# Patient Record
Sex: MALE | Race: WHITE | NOT HISPANIC OR LATINO | Employment: OTHER | RURAL
[De-identification: names, ages, dates, MRNs, and addresses within clinical notes are randomized per-mention and may not be internally consistent; named-entity substitution may affect disease eponyms.]

---

## 2022-06-27 ENCOUNTER — OFFICE VISIT (OUTPATIENT)
Dept: FAMILY MEDICINE CLINIC | Facility: CLINIC | Age: 67
End: 2022-06-27

## 2022-06-27 VITALS
WEIGHT: 159 LBS | DIASTOLIC BLOOD PRESSURE: 70 MMHG | HEIGHT: 65 IN | SYSTOLIC BLOOD PRESSURE: 136 MMHG | BODY MASS INDEX: 26.49 KG/M2 | HEART RATE: 57 BPM | OXYGEN SATURATION: 97 %

## 2022-06-27 DIAGNOSIS — R20.2 PARESTHESIAS: ICD-10-CM

## 2022-06-27 DIAGNOSIS — R53.83 FATIGUE, UNSPECIFIED TYPE: ICD-10-CM

## 2022-06-27 DIAGNOSIS — K64.0 GRADE I HEMORRHOIDS: ICD-10-CM

## 2022-06-27 DIAGNOSIS — R03.0 ELEVATED BLOOD PRESSURE READING IN OFFICE WITHOUT DIAGNOSIS OF HYPERTENSION: ICD-10-CM

## 2022-06-27 DIAGNOSIS — Z00.01 ENCOUNTER FOR GENERAL ADULT MEDICAL EXAMINATION WITH ABNORMAL FINDINGS: Primary | ICD-10-CM

## 2022-06-27 DIAGNOSIS — Z12.11 COLON CANCER SCREENING: ICD-10-CM

## 2022-06-27 DIAGNOSIS — Z12.5 PROSTATE CANCER SCREENING: ICD-10-CM

## 2022-06-27 DIAGNOSIS — N40.1 BENIGN PROSTATIC HYPERPLASIA (BPH) WITH URINARY URGENCY: ICD-10-CM

## 2022-06-27 DIAGNOSIS — D49.2 SKIN NEOPLASM: ICD-10-CM

## 2022-06-27 DIAGNOSIS — E78.5 DYSLIPIDEMIA: ICD-10-CM

## 2022-06-27 DIAGNOSIS — J30.1 SEASONAL ALLERGIC RHINITIS DUE TO POLLEN: ICD-10-CM

## 2022-06-27 DIAGNOSIS — R39.15 BENIGN PROSTATIC HYPERPLASIA (BPH) WITH URINARY URGENCY: ICD-10-CM

## 2022-06-27 DIAGNOSIS — N39.41 URGE INCONTINENCE OF URINE: ICD-10-CM

## 2022-06-27 PROCEDURE — 36415 COLL VENOUS BLD VENIPUNCTURE: CPT | Performed by: FAMILY MEDICINE

## 2022-06-27 PROCEDURE — 3008F BODY MASS INDEX DOCD: CPT | Performed by: FAMILY MEDICINE

## 2022-06-27 PROCEDURE — 99387 INIT PM E/M NEW PAT 65+ YRS: CPT | Performed by: FAMILY MEDICINE

## 2022-06-27 PROCEDURE — 2014F MENTAL STATUS ASSESS: CPT | Performed by: FAMILY MEDICINE

## 2022-06-27 NOTE — PROGRESS NOTES
Chief Complaint  Establish Care (Has not seen a PCP for many years needs a new PCP. ) and Annual Exam    Subjective      Artur Pinto presents to [facility]  History of Present Illness  Patient says he is here for annual physical exam.  He has not had a primary care physician for many years.  His wife  2 months ago of cancer.  He is raising 3 step grandchildren that he is adopted as his own children aged 9, 10, and 11.  He says he is coping okay with his wife's death.  He has had some fatigue for the last 2 years, just does not have as much stamina as he used to, but denies any chest pain or shortness of breath, and no history of any near syncope or presyncope.  He does have some seasonal allergies with runny nose and sneezing.  Patient reports a history of urinary frequency and urgency and nocturia every 2 hours.  He says he underwent a procedure with a urologist a couple of years ago that involved a cystoscopy and possibly something else, but he cannot recall the name of the procedure and does not know exactly what was done.  He has borderline literacy, stating that he was passed through until the ninth grade but never really developed good reading skills.  He says that what ever the urologist found with a cystoscopy, he was told that he could remove it if the patient wanted him to but he did not have to and it would not cause him any problems in the long run.  I asked him if it was something to treat enlarged prostate or possibly urethral stricture, the patient simply does not know.    His only other current complaint is he has some symptoms of a hemorrhoid over the last couple of weeks with a tender itchy nodule at his anal opening, but denies any rectal bleeding or melena.  Denies any other GI symptoms, see review of systems.  He states his stool has always been a bit loose but he only has 1 bowel movement a day, although sometimes he can have a little bit of urge fecal  "incontinence along with his urge urinary incontinence.  He does have onychomycosis of his right thumbnail and right big toe, but this does not bother him.  His only other complaint is that he is noted a lesion on his left outer ear that will not heal, has been present for about 2 years and is crusty, sometimes bleeding.  He has been in the sun a lot throughout his life.    The patient said he has not seen a primary care doctor in many years, but he has been getting a flu shot and pneumonia vaccines regularly over the last 15 years and does not need any boosters on anything right now.  He does not think he has had the shingles vaccine so I would did advise him to get that.  Patient's last colonoscopy was 25 years ago so we did discuss colon cancer screening, and he prefers to do a Cologuard test at this time, declines to do colonoscopy.  Pros and cons of each test were discussed    Objective   Vital Signs:   Vitals:    06/27/22 1118 06/27/22 1210   BP: 152/70 136/70   BP Location: Left arm Left arm   Patient Position: Sitting Sitting   Cuff Size: Large Adult    Pulse: 57    SpO2: 97%    Weight: 72.1 kg (159 lb)    Height: 165.1 cm (65\")       /70 (BP Location: Left arm, Patient Position: Sitting)   Pulse 57   Ht 165.1 cm (65\")   Wt 72.1 kg (159 lb)   SpO2 97%   BMI 26.46 kg/m²     Body mass index is 26.46 kg/m².    Review of Systems   Constitutional: Positive for fatigue. Negative for chills, diaphoresis, fever, unexpected weight gain and unexpected weight loss.   HENT: Positive for hearing loss, postnasal drip, rhinorrhea and sneezing. Negative for congestion, ear discharge, ear pain, facial swelling, mouth sores, nosebleeds, sinus pressure, sore throat, swollen glands, trouble swallowing and voice change.    Eyes: Negative for blurred vision, double vision, photophobia, pain, redness and visual disturbance.   Respiratory: Negative for apnea, cough, chest tightness, shortness of breath and wheezing.  "   Cardiovascular: Negative for chest pain, palpitations and leg swelling.        PND, orthopnea   Gastrointestinal: Positive for rectal pain. Negative for abdominal distention, abdominal pain, anal bleeding, blood in stool, constipation, diarrhea, nausea, vomiting, GERD and indigestion.        Dysphagia, odynophagia   Endocrine: Negative for polydipsia, polyphagia and polyuria.   Genitourinary: Positive for frequency, nocturia, urgency and urinary incontinence. Negative for decreased urine volume, difficulty urinating, dysuria, hematuria, penile swelling, scrotal swelling and testicular pain.   Musculoskeletal: Negative for arthralgias (unusual/atypica), back pain, gait problem, joint swelling, myalgias and neck pain.   Skin: Positive for skin lesions (worrisome/suspicious). Negative for rash and bruise.   Allergic/Immunologic: Positive for environmental allergies. Negative for food allergies.   Neurological: Negative for dizziness, tremors, seizures, syncope, speech difficulty, weakness, light-headedness, numbness, headache, memory problem and confusion.   Hematological: Negative for adenopathy. Does not bruise/bleed easily.   Psychiatric/Behavioral: Positive for stress. Negative for sleep disturbance, suicidal ideas and depressed mood. The patient is not nervous/anxious.        Past History:  Medical History: has a past medical history of BPH associated with nocturia.   Surgical History: has a past surgical history that includes Inguinal hernia repair (Left, 2010); Colonoscopy (1998); and Cystoscopy.   Family History: family history includes Diabetes type II in his sister; Heart failure in his mother; Peripheral vascular disease in his mother; Stomach cancer in his father.   Social History: reports that he has never smoked. He has never used smokeless tobacco. He reports current alcohol use of about 3.0 standard drinks of alcohol per week. He reports that he does not use drugs.    No current outpatient medications  on file.    Allergies: Patient has no known allergies.    Physical Exam  Constitutional:       General: He is not in acute distress.     Appearance: Normal appearance. He is not toxic-appearing.   HENT:      Head: Normocephalic and atraumatic.      Right Ear: Tympanic membrane, ear canal and external ear normal.      Left Ear: Tympanic membrane and ear canal normal.      Ears:      Comments: Slightly ulcerated eschar on left auricle about 6 to 8 mm in diameter inside the outer helix     Nose: Rhinorrhea present.      Mouth/Throat:      Mouth: Mucous membranes are moist.      Pharynx: Oropharynx is clear. No posterior oropharyngeal erythema.   Eyes:      General: No scleral icterus.     Extraocular Movements: Extraocular movements intact.      Conjunctiva/sclera: Conjunctivae normal.      Pupils: Pupils are equal, round, and reactive to light.   Neck:      Vascular: No carotid bruit.   Cardiovascular:      Rate and Rhythm: Normal rate and regular rhythm.      Pulses: Normal pulses.      Heart sounds: Normal heart sounds. No murmur heard.    No gallop.   Pulmonary:      Effort: Pulmonary effort is normal.      Breath sounds: Normal breath sounds. No wheezing, rhonchi or rales.   Chest:      Chest wall: No tenderness.   Abdominal:      General: Bowel sounds are normal. There is no distension.      Palpations: Abdomen is soft. There is no mass.      Tenderness: There is no abdominal tenderness. There is no right CVA tenderness, left CVA tenderness, guarding or rebound.      Comments: Exam of external anal opening shows a small hemorrhoid, not thrombosed, and 9:00   Musculoskeletal:         General: No swelling, tenderness or deformity. Normal range of motion.      Cervical back: Normal range of motion. No rigidity.      Right lower leg: No edema.      Left lower leg: No edema.   Lymphadenopathy:      Cervical: No cervical adenopathy.   Skin:     General: Skin is warm and dry.      Capillary Refill: Capillary refill  takes less than 2 seconds.      Coloration: Skin is not jaundiced or pale.      Findings: Lesion (See ear exam) present. No bruising, erythema or rash.   Neurological:      General: No focal deficit present.      Mental Status: He is alert and oriented to person, place, and time.      Cranial Nerves: No cranial nerve deficit.      Sensory: No sensory deficit.      Motor: No weakness.      Coordination: Coordination normal.      Gait: Gait normal.      Deep Tendon Reflexes: Reflexes normal.   Psychiatric:         Mood and Affect: Mood normal.         Behavior: Behavior normal.         Thought Content: Thought content normal.         Judgment: Judgment normal.          Result Review :               Assessment and Plan   Diagnoses and all orders for this visit:    1. Encounter for general adult medical examination with abnormal findings (Primary)  Preventive healthcare issues were discussed at length, and the patient does agreed to do a Cologuard test, we will check PSA with labs today.  He states he is up-to-date on pneumococcal vaccines and flu vaccines but will get the shingles vaccine at the pharmacy.  Healthy lifestyle including diet and exercise were discussed.  Need for annual physical exam was discussed  2. Dyslipidemia  -     Lipid Panel; Future  -     Lipid Panel    3. Fatigue, unspecified type  -     CBC Auto Differential; Future  -     Comprehensive Metabolic Panel; Future  -     TSH; Future  -     CBC Auto Differential  -     Comprehensive Metabolic Panel  -     TSH  Differential diagnosis discussed.  Patient says he just attributed the symptoms to aging, but we will check labs.  Red flag and warning signs and symptoms were discussed, so if he develops any chest pain or shortness of breath or presyncope he will let us know.  4. Elevated blood pressure reading in office without diagnosis of hypertension  Recheck of blood pressure was normal, patient will monitor  5. Prostate cancer screening  -     PSA  "Screen; Future  -     PSA Screen    6. Skin neoplasm  -     Ambulatory Referral to ENT (Otolaryngology)  Skin lesion on left outer ear looks suspicious for malignancy, so we will refer to ENT due to location.  Option of seeing dermatology was discussed, but since this will almost certainly need surgical removal of a large area of the left outer ear ENT was felt more appropriate skin protection was discussed  7. Colon cancer screening  -     Cologuard - Stool, Per Rectum; Future    8. Grade I hemorrhoids  Patient will take fiber supplement and apply over-the-counter hydrocortisone cream 2-3 times a day, may do sitz bath's if desired, and if symptoms worsen he will let me know  9. Benign prostatic hyperplasia (BPH) with urinary urgency  We will attempt to obtain records so we can see what the urologist found and what he did.  The patient does not want to take any medications currently.  I would assume that the frequency and urgency are due to BPH, but the reported gives about the urologist finding something \"that he was born with\" which \"should not harm him in the future\"  Wonder if there is a stricture or some other anatomic abnormality there.  10. Paresthesias  -     Vitamin B12; Future  -     Folate; Future  -     Vitamin B12  -     Folate  Patient did report an occasional tingle in his he underfoot but nothing persistent or worrisome, but since also has fatigue we will check B12 and folic acid.  I advised him to come back in 1 year or sooner if needed.  We did discuss normal grieving, and he seems to be coping with the death of his wife pretty well.  However, he is now a single parent and I told him that things can sometimes get overwhelming so if he develops any depression symptoms he can return to discuss treatment options.               Follow Up   Return in about 1 year (around 6/27/2023) for Annual physical.  Patient was given instructions and counseling regarding his condition or for health maintenance " advice. Please see specific information pulled into the AVS if appropriate.     Kameron Pro MD

## 2022-06-28 LAB
ALBUMIN SERPL-MCNC: 4.3 G/DL (ref 3.8–4.8)
ALBUMIN/GLOB SERPL: 1.5 {RATIO} (ref 1.2–2.2)
ALP SERPL-CCNC: 50 IU/L (ref 44–121)
ALT SERPL-CCNC: 15 IU/L (ref 0–44)
AST SERPL-CCNC: 19 IU/L (ref 0–40)
BASOPHILS # BLD AUTO: 0.1 X10E3/UL (ref 0–0.2)
BASOPHILS NFR BLD AUTO: 1 %
BILIRUB SERPL-MCNC: 0.6 MG/DL (ref 0–1.2)
BUN SERPL-MCNC: 13 MG/DL (ref 8–27)
BUN/CREAT SERPL: 15 (ref 10–24)
CALCIUM SERPL-MCNC: 9.4 MG/DL (ref 8.6–10.2)
CHLORIDE SERPL-SCNC: 106 MMOL/L (ref 96–106)
CHOLEST SERPL-MCNC: 194 MG/DL (ref 100–199)
CO2 SERPL-SCNC: 21 MMOL/L (ref 20–29)
CREAT SERPL-MCNC: 0.86 MG/DL (ref 0.76–1.27)
EGFRCR SERPLBLD CKD-EPI 2021: 95 ML/MIN/1.73
EOSINOPHIL # BLD AUTO: 0.6 X10E3/UL (ref 0–0.4)
EOSINOPHIL NFR BLD AUTO: 7 %
ERYTHROCYTE [DISTWIDTH] IN BLOOD BY AUTOMATED COUNT: 13.6 % (ref 11.6–15.4)
FOLATE SERPL-MCNC: 7.4 NG/ML
GLOBULIN SER CALC-MCNC: 2.9 G/DL (ref 1.5–4.5)
GLUCOSE SERPL-MCNC: 100 MG/DL (ref 65–99)
HCT VFR BLD AUTO: 50 % (ref 37.5–51)
HDLC SERPL-MCNC: 41 MG/DL
HGB BLD-MCNC: 16.6 G/DL (ref 13–17.7)
IMM GRANULOCYTES # BLD AUTO: 0 X10E3/UL (ref 0–0.1)
IMM GRANULOCYTES NFR BLD AUTO: 0 %
LDLC SERPL CALC-MCNC: 135 MG/DL (ref 0–99)
LYMPHOCYTES # BLD AUTO: 1.8 X10E3/UL (ref 0.7–3.1)
LYMPHOCYTES NFR BLD AUTO: 20 %
MCH RBC QN AUTO: 29.6 PG (ref 26.6–33)
MCHC RBC AUTO-ENTMCNC: 33.2 G/DL (ref 31.5–35.7)
MCV RBC AUTO: 89 FL (ref 79–97)
MONOCYTES # BLD AUTO: 0.5 X10E3/UL (ref 0.1–0.9)
MONOCYTES NFR BLD AUTO: 6 %
NEUTROPHILS # BLD AUTO: 6 X10E3/UL (ref 1.4–7)
NEUTROPHILS NFR BLD AUTO: 66 %
PLATELET # BLD AUTO: 362 X10E3/UL (ref 150–450)
POTASSIUM SERPL-SCNC: 4.8 MMOL/L (ref 3.5–5.2)
PROT SERPL-MCNC: 7.2 G/DL (ref 6–8.5)
PSA SERPL-MCNC: 0.9 NG/ML (ref 0–4)
RBC # BLD AUTO: 5.61 X10E6/UL (ref 4.14–5.8)
SODIUM SERPL-SCNC: 140 MMOL/L (ref 134–144)
TRIGL SERPL-MCNC: 96 MG/DL (ref 0–149)
TSH SERPL DL<=0.005 MIU/L-ACNC: 1.69 UIU/ML (ref 0.45–4.5)
VIT B12 SERPL-MCNC: 391 PG/ML (ref 232–1245)
VLDLC SERPL CALC-MCNC: 18 MG/DL (ref 5–40)
WBC # BLD AUTO: 9 X10E3/UL (ref 3.4–10.8)

## 2022-12-01 ENCOUNTER — OFFICE VISIT (OUTPATIENT)
Dept: FAMILY MEDICINE CLINIC | Facility: CLINIC | Age: 67
End: 2022-12-01

## 2022-12-01 VITALS
SYSTOLIC BLOOD PRESSURE: 120 MMHG | DIASTOLIC BLOOD PRESSURE: 76 MMHG | HEART RATE: 83 BPM | HEIGHT: 65 IN | BODY MASS INDEX: 28.42 KG/M2 | OXYGEN SATURATION: 98 % | WEIGHT: 170.56 LBS

## 2022-12-01 DIAGNOSIS — H93.90 EAR LESION: ICD-10-CM

## 2022-12-01 DIAGNOSIS — N40.1 BENIGN PROSTATIC HYPERPLASIA (BPH) WITH URINARY URGENCY: ICD-10-CM

## 2022-12-01 DIAGNOSIS — Z71.89 ADVANCED DIRECTIVES, COUNSELING/DISCUSSION: ICD-10-CM

## 2022-12-01 DIAGNOSIS — E78.2 MIXED HYPERLIPIDEMIA: ICD-10-CM

## 2022-12-01 DIAGNOSIS — N40.0 BENIGN PROSTATIC HYPERPLASIA WITHOUT LOWER URINARY TRACT SYMPTOMS: ICD-10-CM

## 2022-12-01 DIAGNOSIS — Z12.5 SCREENING FOR PROSTATE CANCER: ICD-10-CM

## 2022-12-01 DIAGNOSIS — Z12.11 SCREENING FOR COLON CANCER: ICD-10-CM

## 2022-12-01 DIAGNOSIS — R39.15 BENIGN PROSTATIC HYPERPLASIA (BPH) WITH URINARY URGENCY: ICD-10-CM

## 2022-12-01 DIAGNOSIS — Z00.00 INITIAL MEDICARE ANNUAL WELLNESS VISIT: Primary | ICD-10-CM

## 2022-12-01 DIAGNOSIS — K64.9 HEMORRHOIDS, UNSPECIFIED HEMORRHOID TYPE: ICD-10-CM

## 2022-12-01 PROCEDURE — 1170F FXNL STATUS ASSESSED: CPT | Performed by: NURSE PRACTITIONER

## 2022-12-01 PROCEDURE — 1159F MED LIST DOCD IN RCRD: CPT | Performed by: NURSE PRACTITIONER

## 2022-12-01 PROCEDURE — G0438 PPPS, INITIAL VISIT: HCPCS | Performed by: NURSE PRACTITIONER

## 2022-12-01 PROCEDURE — 99213 OFFICE O/P EST LOW 20 MIN: CPT | Performed by: NURSE PRACTITIONER

## 2022-12-01 RX ORDER — DOXYCYCLINE HYCLATE 100 MG/1
100 CAPSULE ORAL 2 TIMES DAILY
Qty: 20 CAPSULE | Refills: 0 | Status: SHIPPED | OUTPATIENT
Start: 2022-12-01

## 2022-12-01 NOTE — ASSESSMENT & PLAN NOTE
Patient had blood work completed in June.  Patient denies hepatitis C screening at this time.  Proper diet and exercise plan discussed and encouraged.  Annual dental and eye exams encouraged.  Cologuard up-to-date. Understands limitations of cologuard vs colonsocopy.  He does not smoke and states he has never smoked.  He denies immunizations at this time and encouraged him to discuss pneumonia shingles tetanus COVID vaccines with his pharmacist.  Flu vaccine up-to-date.  Does not need help with ADLs.  Continue wearing hearing aids.  No cognitive impairment.  Continue follow-up with PCP Dr. Pro.  Return to clinic or ED with any issues or concerns.

## 2022-12-01 NOTE — PROGRESS NOTES
"Chief Complaint  Left ear sore (Right thumb discoloration)    Subjective          Artur Pinto presents to [facility]  History of Present Illness     Patient states for a year he has had a sore on the upper fold of his left ear.  States it is tender.  Slightly reddened.  No fever no chills no body aches.  He does keep it clean with soap and water.  States it never fully heals up and is usually always there.  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    Objective   Vital Signs:   /76   Pulse 83   Ht 165.1 cm (65\")   Wt 77.4 kg (170 lb 9 oz)   SpO2 98%   BMI 28.38 kg/m²     Body mass index is 28.38 kg/m².    Review of Systems   Constitutional: Negative for chills, fatigue and fever.   HENT: Negative for ear discharge and ear pain.    Eyes: Negative for visual disturbance.   Respiratory: Negative for cough.    Cardiovascular: Negative for chest pain.   Genitourinary: Negative for dysuria, frequency and hematuria.   Musculoskeletal: Negative for arthralgias and back pain.   Skin: Positive for skin lesions.   Neurological: Negative for dizziness, headache and confusion.       Past History:  Medical History: has a past medical history of BPH associated with nocturia.   Surgical History: has a past surgical history that includes Inguinal hernia repair (Left, 2010); Colonoscopy (1998); and Cystoscopy.   Family History: family history includes Diabetes type II in his sister; Heart failure in his mother; Peripheral vascular disease in his mother; Stomach cancer in his father.   Social History: reports that he has never smoked. He has never used smokeless tobacco. He reports current alcohol use of about 3.0 standard drinks per week. He reports that he does not use drugs.    PHQ-2 Depression Screening  Little interest or pleasure in doing things? 0-->not at all   Feeling down, depressed, or hopeless? 0-->not at all   PHQ-2 Total Score 0        PHQ-9 " Depression Screening  Little interest or pleasure in doing things? 0-->not at all   Feeling down, depressed, or hopeless? 0-->not at all   Trouble falling or staying asleep, or sleeping too much?     Feeling tired or having little energy?     Poor appetite or overeating?     Feeling bad about yourself - or that you are a failure or have let yourself or your family down?     Trouble concentrating on things, such as reading the newspaper or watching television?     Moving or speaking so slowly that other people could have noticed? Or the opposite - being so fidgety or restless that you have been moving around a lot more than usual?     Thoughts that you would be better off dead, or of hurting yourself in some way?     PHQ-9 Total Score 0   If you checked off any problems, how difficult have these problems made it for you to do your work, take care of things at home, or get along with other people?       PHQ-9 Total Score: 0      Patient screened positive for depression based on a PHQ-9 score of 0 on 12/1/2022. Follow-up recommendations include:       Current Outpatient Medications:   •  doxycycline (VIBRAMYCIN) 100 MG capsule, Take 1 capsule by mouth 2 (Two) Times a Day., Disp: 20 capsule, Rfl: 0   (Not in a hospital admission)     Allergies: Patient has no known allergies.    Physical Exam  Constitutional:       Appearance: Normal appearance.   Eyes:      Conjunctiva/sclera: Conjunctivae normal.      Pupils: Pupils are equal, round, and reactive to light.   Cardiovascular:      Rate and Rhythm: Normal rate and regular rhythm.      Heart sounds: Normal heart sounds.   Pulmonary:      Effort: Pulmonary effort is normal.      Breath sounds: Normal breath sounds.   Skin:     Comments: Left ear upper fold area there is a small lesion present with top layer skin off that has clear drainage. Tender to palpation.    Neurological:      General: No focal deficit present.      Mental Status: He is alert and oriented to person,  place, and time. Mental status is at baseline.   Psychiatric:         Mood and Affect: Mood normal.         Behavior: Behavior normal.         Thought Content: Thought content normal.         Judgment: Judgment normal.          Result Review :                   Assessment and Plan    Diagnoses and all orders for this visit:    1. Ear lesion (Primary)  Assessment & Plan:  Ear culture obtained and sent.  Informed patient to call in 2 to 3 days for results.  Antibiotic as prescribed as likely infection present due to tenderness and drainage.  Keep area clean with soap and water.  Risk of meds discussed understood.  Since it has been there for years we will also go ahead and refer to dermatology for further evaluation.  Education provided.  Return in 2 days if no improvement, sooner if worsens.  Return to clinic or ED with any issues or concerns.    Orders:  -     Ambulatory Referral to Dermatology  -     doxycycline (VIBRAMYCIN) 100 MG capsule; Take 1 capsule by mouth 2 (Two) Times a Day.  Dispense: 20 capsule; Refill: 0  -     Anaerobic & Aerobic Culture ([facility] Only) - Swab, Ear, Left            BMI is >= 25 and <30. (Overweight) The following options were offered after discussion;: exercise counseling/recommendations and nutrition counseling/recommendations       Follow Up   Return if symptoms worsen or fail to improve.  Patient was given instructions and counseling regarding his condition or for health maintenance advice. Please see specific information pulled into the AVS if appropriate.     SUSI Erwin

## 2022-12-01 NOTE — PROGRESS NOTES
The ABCs of the Annual Wellness Visit  Initial Medicare Wellness Visit    Chief Complaint   Patient presents with   • Left ear sore     Right thumb discoloration     Subjective   History of Present Illness:  Artur Pinto is a 67 y.o. male who presents for an Initial Medicare Wellness Visit.    The following portions of the patient's history were reviewed and   updated as appropriate: allergies, current medications, past family history, past medical history, past social history, past surgical history and problem list.     Compared to one year ago, the patient feels his physical   health is the same.    Compared to one year ago, the patient feels his mental   health is the same.    Recent Hospitalizations:  He was not admitted to the hospital during the last year.       Current Medical Providers:  Patient Care Team:  Kameron Pro MD as PCP - General (Family Medicine)    No outpatient medications prior to visit.     No facility-administered medications prior to visit.       No opioid medication identified on active medication list. I have reviewed chart for other potential  high risk medication/s and harmful drug interactions in the elderly.          Aspirin is not on active medication list.  Aspirin use is not indicated based on review of current medical condition/s. Risk of harm outweighs potential benefits.  .    Patient Active Problem List   Diagnosis   • Benign prostatic hyperplasia (BPH) with urinary urgency   • Urge incontinence of urine   • Ear lesion   • Initial Medicare annual wellness visit   • Advanced directives, counseling/discussion   • Screening for colon cancer   • Screening for prostate cancer   • Mixed hyperlipidemia   • Hemorrhoids   • BPH (benign prostatic hyperplasia)     Advance Care Planning  Advance Directive is not on file.  ACP discussion was held with the patient during this visit. Patient has an advance directive (not in EMR), copy requested.          Objective       Vitals:     "12/01/22 0817 12/01/22 0938   BP: 120/76    Pulse:  83   SpO2:  98%   Weight: 77.4 kg (170 lb 9 oz)    Height: 165.1 cm (65\")      Estimated body mass index is 28.38 kg/m² as calculated from the following:    Height as of this encounter: 165.1 cm (65\").    Weight as of this encounter: 77.4 kg (170 lb 9 oz).    BMI is >= 25 and <30. (Overweight) The following options were offered after discussion;: exercise counseling/recommendations and nutrition counseling/recommendations      Does the patient have evidence of cognitive impairment? No    Physical Exam          HEALTH RISK ASSESSMENT    Smoking Status:  Social History     Tobacco Use   Smoking Status Never   Smokeless Tobacco Never     Alcohol Consumption:  Social History     Substance and Sexual Activity   Alcohol Use Yes   • Alcohol/week: 3.0 standard drinks   • Types: 3 Cans of beer per week     Fall Risk Screen:    LUKE Fall Risk Assessment was completed, and patient is at LOW risk for falls.Assessment completed on:6/27/2022    Depression Screen:   PHQ-2/PHQ-9 Depression Screening 12/1/2022   Little Interest or Pleasure in Doing Things 0-->not at all   Feeling Down, Depressed or Hopeless 0-->not at all   PHQ-9: Brief Depression Severity Measure Score 0       Health Habits and Functional and Cognitive Screening:  Functional & Cognitive Status 12/1/2022   Do you have difficulty preparing food and eating? No   Do you have difficulty bathing yourself, getting dressed or grooming yourself? No   Do you have difficulty using the toilet? No   Do you have difficulty moving around from place to place? No   Do you have trouble with steps or getting out of a bed or a chair? No   Current Diet Other   Dental Exam Up to date   Eye Exam Up to date   Exercise (times per week) 7 times per week   Current Exercises Include Other   Do you need help using the phone?  No   Are you deaf or do you have serious difficulty hearing?  No   Do you need help with transportation? No   Do you " need help shopping? No   Do you need help preparing meals?  No   Do you need help with housework?  No   Do you need help with laundry? No   Do you need help taking your medications? No   Do you need help managing money? No   Do you ever drive or ride in a car without wearing a seat belt? No   Have you felt unusual stress, anger or loneliness in the last month? No   Who do you live with? Alone   If you need help, do you have trouble finding someone available to you? No   Have you been bothered in the last four weeks by sexual problems? No   Do you have difficulty concentrating, remembering or making decisions? No       Age-appropriate Screening Schedule:  Refer to the list below for future screening recommendations based on patient's age, sex and/or medical conditions. Orders for these recommended tests are listed in the plan section. The patient has been provided with a written plan.    Health Maintenance   Topic Date Due   • TDAP/TD VACCINES (1 - Tdap) Never done   • ZOSTER VACCINE (1 of 2) Never done   • LIPID PANEL  06/27/2023   • INFLUENZA VACCINE  Completed            Assessment & Plan   CMS Preventative Services Quick Reference  Risk Factors Identified During Encounter  Hearing Problem  Inadequate Social Support, Isolation, Loneliness, Lack of Transportation, Financial Difficulties, or Caregiver Stress   The above risks/problems have been discussed with the patient.  Follow up actions/plans if indicated are seen below in the Assessment/Plan Section.  Pertinent information has been shared with the patient in the After Visit Summary.    Diagnoses and all orders for this visit:    1. Initial Medicare annual wellness visit (Primary)  Assessment & Plan:  Patient had blood work completed in June.  Patient denies hepatitis C screening at this time.  Proper diet and exercise plan discussed and encouraged.  Annual dental and eye exams encouraged.  Cologuard up-to-date. Understands limitations of cologuard vs  colonsocopy.  He does not smoke and states he has never smoked.  He denies immunizations at this time and encouraged him to discuss pneumonia shingles tetanus COVID vaccines with his pharmacist.  Flu vaccine up-to-date.  Does not need help with ADLs.  Continue wearing hearing aids.  No cognitive impairment.  Continue follow-up with PCP Dr. Pro.  Return to clinic or ED with any issues or concerns.      2. Ear lesion  Assessment & Plan:  Ear culture obtained and sent.  Informed patient to call in 2 to 3 days for results.  Antibiotic as prescribed as likely infection present due to tenderness and drainage.  Keep area clean with soap and water.  Risk of meds discussed understood.  Since it has been there for years we will also go ahead and refer to dermatology for further evaluation.  Education provided.  Return in 2 days if no improvement, sooner if worsens.  Return to clinic or ED with any issues or concerns.    Orders:  -     Ambulatory Referral to Dermatology  -     doxycycline (VIBRAMYCIN) 100 MG capsule; Take 1 capsule by mouth 2 (Two) Times a Day.  Dispense: 20 capsule; Refill: 0  -     Anaerobic & Aerobic Culture ([facility] Only) - Swab, Ear, Left    3. Advanced directives, counseling/discussion    4. Mixed hyperlipidemia  Assessment & Plan:  Follow-up with PCP Dr. Pro as scheduled.      5. Screening for colon cancer    6. Hemorrhoids, unspecified hemorrhoid type  Assessment & Plan:  Follow-up with PCP Dr. Pro as scheduled.      7. Benign prostatic hyperplasia (BPH) with urinary urgency  Assessment & Plan:  Follow-up with PCP Dr. Pro as scheduled.      8. Screening for prostate cancer    9. Benign prostatic hyperplasia without lower urinary tract symptoms      Follow Up:  Return if symptoms worsen or fail to improve.     An After Visit Summary and PPPS were made available to the patient.        I spent 40 minutes caring for Artur on this date of service. This time includes time spent by me in the following  activities:preparing for the visit, reviewing tests, obtaining and/or reviewing a separately obtained history, performing a medically appropriate examination and/or evaluation , counseling and educating the patient/family/caregiver, ordering medications, tests, or procedures, documenting information in the medical record, independently interpreting results and communicating that information with the patient/family/caregiver and care coordination

## 2022-12-01 NOTE — ASSESSMENT & PLAN NOTE
Ear culture obtained and sent.  Informed patient to call in 2 to 3 days for results.  Antibiotic as prescribed as likely infection present due to tenderness and drainage.  Keep area clean with soap and water.  Risk of meds discussed understood.  Since it has been there for years we will also go ahead and refer to dermatology for further evaluation.  Education provided.  Return in 2 days if no improvement, sooner if worsens.  Return to clinic or ED with any issues or concerns.

## 2022-12-06 LAB
BACTERIA SPEC AEROBE CULT: ABNORMAL
BACTERIA SPEC ANAEROBE CULT: ABNORMAL
BACTERIA SPEC CULT: ABNORMAL
BACTERIA SPEC CULT: ABNORMAL
OTHER ANTIBIOTIC SUSC ISLT: ABNORMAL

## 2022-12-08 ENCOUNTER — TELEPHONE (OUTPATIENT)
Dept: FAMILY MEDICINE CLINIC | Facility: CLINIC | Age: 67
End: 2022-12-08

## 2023-09-28 ENCOUNTER — OFFICE VISIT (OUTPATIENT)
Dept: FAMILY MEDICINE CLINIC | Facility: CLINIC | Age: 68
End: 2023-09-28
Payer: MEDICARE

## 2023-09-28 VITALS
WEIGHT: 165.6 LBS | HEART RATE: 66 BPM | BODY MASS INDEX: 27.59 KG/M2 | SYSTOLIC BLOOD PRESSURE: 120 MMHG | HEIGHT: 65 IN | OXYGEN SATURATION: 97 % | DIASTOLIC BLOOD PRESSURE: 78 MMHG

## 2023-09-28 DIAGNOSIS — Z12.5 PROSTATE CANCER SCREENING: ICD-10-CM

## 2023-09-28 DIAGNOSIS — R73.9 BLOOD GLUCOSE ELEVATED: ICD-10-CM

## 2023-09-28 DIAGNOSIS — L82.1 SEBORRHEIC KERATOSIS: ICD-10-CM

## 2023-09-28 DIAGNOSIS — H93.90 EAR LESION: ICD-10-CM

## 2023-09-28 DIAGNOSIS — E78.2 MIXED HYPERLIPIDEMIA: ICD-10-CM

## 2023-09-28 DIAGNOSIS — R35.1 BENIGN PROSTATIC HYPERPLASIA WITH NOCTURIA: ICD-10-CM

## 2023-09-28 DIAGNOSIS — Z79.899 ENCOUNTER FOR LONG-TERM (CURRENT) USE OF OTHER MEDICATIONS: ICD-10-CM

## 2023-09-28 DIAGNOSIS — Z00.01 ENCOUNTER FOR GENERAL ADULT MEDICAL EXAMINATION WITH ABNORMAL FINDINGS: Primary | ICD-10-CM

## 2023-09-28 DIAGNOSIS — N40.1 BENIGN PROSTATIC HYPERPLASIA WITH NOCTURIA: ICD-10-CM

## 2023-09-28 DIAGNOSIS — R53.83 OTHER FATIGUE: ICD-10-CM

## 2023-09-28 DIAGNOSIS — Z11.59 NEED FOR HEPATITIS C SCREENING TEST: ICD-10-CM

## 2023-09-28 PROCEDURE — 99397 PER PM REEVAL EST PAT 65+ YR: CPT | Performed by: FAMILY MEDICINE

## 2023-09-28 NOTE — PROGRESS NOTES
"Chief Complaint  Annual Exam    Subjective      Artur Pinto presents to [facility]  History of Present Illness  Patient is here for annual exam.  Says has been feeling fairly well overall.  He does have a couple spots on his right arm that seem to come and go and itch a little at times that he wants to be checked.  The skin lesion on his left ear is still present.  The patient was referred to ear nose and throat in June 2022, but he says he never received any phone calls about the referral.  Patient then was referred to dermatology by another provider here for the same lesion in December 2022, but he says he never heard anything of then either.  [place] service [place] spotty, and if the patient does not recognize the phone number he will assume once a spam call.  Therefore I advised him once again that we need to make sure that the lesion on the left ear gets removed and we will try sending him a letter with the appointment time and information.  Objective   Vital Signs:   Vitals:    09/28/23 0810   BP: 120/78   BP Location: Left arm   Patient Position: Sitting   Cuff Size: Adult   Pulse: 66   SpO2: 97%   Weight: 75.1 kg (165 lb 9.6 oz)   Height: 165.1 cm (65\")      /78 (BP Location: Left arm, Patient Position: Sitting, Cuff Size: Adult)   Pulse 66   Ht 165.1 cm (65\")   Wt 75.1 kg (165 lb 9.6 oz)   SpO2 97%   BMI 27.56 kg/m²     Body mass index is 27.56 kg/m².    Review of Systems   Constitutional:  Negative for chills, fever and unexpected weight loss.   HENT:  Negative for ear discharge, ear pain, mouth sores, nosebleeds, rhinorrhea, sinus pressure, sore throat, swollen glands and trouble swallowing.    Eyes:  Negative for blurred vision, double vision, pain, redness and visual disturbance.   Respiratory:  Negative for cough, shortness of breath and wheezing.    Cardiovascular:  Negative for chest pain, palpitations and " leg swelling.        PND, orthopnea   Gastrointestinal:  Negative for abdominal distention, abdominal pain, blood in stool, constipation, diarrhea, nausea and vomiting.        Dysphagia, odynophagia   Endocrine: Negative for polydipsia, polyphagia and polyuria.   Genitourinary:  Positive for frequency and urgency. Negative for difficulty urinating, dysuria, hematuria and urinary incontinence.   Musculoskeletal:  Negative for arthralgias (unusual/atypica), gait problem, joint swelling and myalgias.   Skin:  Positive for skin lesions (worrisome/suspicious). Negative for rash and bruise.   Allergic/Immunologic: Negative for food allergies.   Neurological:  Negative for dizziness, tremors, seizures, syncope, weakness, light-headedness, numbness, headache and memory problem.   Hematological:  Negative for adenopathy. Does not bruise/bleed easily.   Psychiatric/Behavioral:  Negative for suicidal ideas and depressed mood. The patient is not nervous/anxious.      Past History:  Medical History: has a past medical history of BPH associated with nocturia.   Surgical History: has a past surgical history that includes Inguinal hernia repair (Left, 2010); Colonoscopy (1998); and Cystoscopy.   Family History: family history includes Diabetes type II in his sister; Heart failure in his mother; Peripheral vascular disease in his mother; Stomach cancer in his father.   Social History: reports that he has never smoked. He has never used smokeless tobacco. He reports current alcohol use of about 3.0 standard drinks per week. He reports that he does not use drugs.      Current Outpatient Medications:     doxycycline (VIBRAMYCIN) 100 MG capsule, Take 1 capsule by mouth 2 (Two) Times a Day., Disp: 20 capsule, Rfl: 0    RSVPreF3 Vac Recomb Adjuvanted (AREXVY) 120 MCG/0.5ML reconstituted suspension injection, Inject 0.5 mL into the appropriate muscle as directed by prescriber 1 (One) Time for 1 dose., Disp: 0.5 mL, Rfl: 0    Allergies:  Patient has no known allergies.    Physical Exam  Constitutional:       General: He is not in acute distress.     Appearance: Normal appearance. He is not toxic-appearing.   HENT:      Head: Normocephalic and atraumatic.      Right Ear: Ear canal and external ear normal.      Left Ear: Ear canal and external ear normal.      Nose: Nose normal.      Mouth/Throat:      Mouth: Mucous membranes are moist.      Pharynx: Oropharynx is clear.   Eyes:      General: No scleral icterus.     Extraocular Movements: Extraocular movements intact.      Conjunctiva/sclera: Conjunctivae normal.      Pupils: Pupils are equal, round, and reactive to light.   Neck:      Vascular: No carotid bruit.   Cardiovascular:      Rate and Rhythm: Normal rate and regular rhythm.      Pulses: Normal pulses.      Heart sounds: Normal heart sounds.   Pulmonary:      Effort: Pulmonary effort is normal.      Breath sounds: Normal breath sounds.   Chest:      Chest wall: No tenderness.   Abdominal:      General: Bowel sounds are normal. There is no distension.      Palpations: Abdomen is soft. There is no mass.      Tenderness: There is no abdominal tenderness. There is no guarding or rebound.   Musculoskeletal:         General: No swelling or deformity. Normal range of motion.      Cervical back: Normal range of motion. No rigidity.      Right lower leg: No edema.      Left lower leg: No edema.   Lymphadenopathy:      Cervical: No cervical adenopathy.   Skin:     General: Skin is warm and dry.      Capillary Refill: Capillary refill takes less than 2 seconds.      Coloration: Skin is not pale.      Findings: Lesion (2 small seborrheic keratosis right forearm, and left arm call till has a crusty lesion about 5 to 7 mm in diameter) present. No erythema or rash.   Neurological:      General: No focal deficit present.      Mental Status: He is alert and oriented to person, place, and time.      Cranial Nerves: No cranial nerve deficit.      Motor: No  weakness.      Coordination: Coordination normal.      Gait: Gait normal.   Psychiatric:         Mood and Affect: Mood normal.         Behavior: Behavior normal.         Thought Content: Thought content normal.         Judgment: Judgment normal.                 Assessment and Plan   Diagnoses and all orders for this visit:    1. Encounter for general adult medical examination with abnormal findings (Primary)  Healthy lifestyle measures including healthy diet regular exercise were discussed.  Preventive healthcare measures were also discussed.  Patient was given a prescription for RSV vaccine and encouraged to get that at the pharmacy.  He is due for a flu vaccine but wishes to wait until next month and get this at the pharmacy as well.  Says he had a pneumococcal vaccine 2 years ago so he is up-to-date on that.  We will check labs today  2. Prostate cancer screening  -     PSA Screen    3. Mixed hyperlipidemia  -     Lipid Panel    4. Benign prostatic hyperplasia with nocturia  Patient has been having nocturia x3-4 but that has improved.  He does have urinary urgency and frequency in the daytime, but no dysuria or hematuria.  We discussed the pros and cons of treatment and the patient does not wish to take any medicine currently, and he feels like his bladder is emptying well, so he will let me know if this changes or if he changes his mind  5. Blood glucose elevated  -     Hemoglobin A1c    6. Need for hepatitis C screening test  -     Hepatitis C Antibody    7. Encounter for long-term (current) use of other medications  -     CBC & Differential  -     Comprehensive Metabolic Panel    8. Other fatigue  -     TSH+Free T4    9. Ear lesion  -     Ambulatory Referral to ENT (Otolaryngology)    10. Seborrheic keratosis  The lesions on the right forearm appear to be seborrheic keratoses, the patient was advised to watch these closely and if they get larger or begin to change colors that he will let us know so we can  refer to have them removed.  He did not wish to have them removed with cryotherapy today.  Other orders  -     RSVPreF3 Vac Recomb Adjuvanted (AREXVY) 120 MCG/0.5ML reconstituted suspension injection; Inject 0.5 mL into the appropriate muscle as directed by prescriber 1 (One) Time for 1 dose.  Dispense: 0.5 mL; Refill: 0            Follow Up   No follow-ups on file.  Patient was given instructions and counseling regarding his condition or for health maintenance advice. Please see specific information pulled into the AVS if appropriate.     Kameron Pro MD

## 2023-09-29 LAB
ALBUMIN SERPL-MCNC: 4.3 G/DL (ref 3.9–4.9)
ALBUMIN/GLOB SERPL: 1.4 {RATIO} (ref 1.2–2.2)
ALP SERPL-CCNC: 57 IU/L (ref 44–121)
ALT SERPL-CCNC: 16 IU/L (ref 0–44)
AST SERPL-CCNC: 18 IU/L (ref 0–40)
BASOPHILS # BLD AUTO: 0.1 X10E3/UL (ref 0–0.2)
BASOPHILS NFR BLD AUTO: 1 %
BILIRUB SERPL-MCNC: 0.9 MG/DL (ref 0–1.2)
BUN SERPL-MCNC: 12 MG/DL (ref 8–27)
BUN/CREAT SERPL: 13 (ref 10–24)
CALCIUM SERPL-MCNC: 9.7 MG/DL (ref 8.6–10.2)
CHLORIDE SERPL-SCNC: 102 MMOL/L (ref 96–106)
CHOLEST SERPL-MCNC: 242 MG/DL (ref 100–199)
CO2 SERPL-SCNC: 22 MMOL/L (ref 20–29)
CREAT SERPL-MCNC: 0.92 MG/DL (ref 0.76–1.27)
EGFRCR SERPLBLD CKD-EPI 2021: 90 ML/MIN/1.73
EOSINOPHIL # BLD AUTO: 0.4 X10E3/UL (ref 0–0.4)
EOSINOPHIL NFR BLD AUTO: 5 %
ERYTHROCYTE [DISTWIDTH] IN BLOOD BY AUTOMATED COUNT: 13 % (ref 11.6–15.4)
GLOBULIN SER CALC-MCNC: 3 G/DL (ref 1.5–4.5)
GLUCOSE SERPL-MCNC: 93 MG/DL (ref 70–99)
HBA1C MFR BLD: 6.6 % (ref 4.8–5.6)
HCT VFR BLD AUTO: 50.6 % (ref 37.5–51)
HCV IGG SERPL QL IA: NON REACTIVE
HDLC SERPL-MCNC: 46 MG/DL
HGB BLD-MCNC: 16.9 G/DL (ref 13–17.7)
IMM GRANULOCYTES # BLD AUTO: 0 X10E3/UL (ref 0–0.1)
IMM GRANULOCYTES NFR BLD AUTO: 0 %
LDLC SERPL CALC-MCNC: 176 MG/DL (ref 0–99)
LYMPHOCYTES # BLD AUTO: 2 X10E3/UL (ref 0.7–3.1)
LYMPHOCYTES NFR BLD AUTO: 25 %
MCH RBC QN AUTO: 30.4 PG (ref 26.6–33)
MCHC RBC AUTO-ENTMCNC: 33.4 G/DL (ref 31.5–35.7)
MCV RBC AUTO: 91 FL (ref 79–97)
MONOCYTES # BLD AUTO: 0.7 X10E3/UL (ref 0.1–0.9)
MONOCYTES NFR BLD AUTO: 8 %
NEUTROPHILS # BLD AUTO: 4.8 X10E3/UL (ref 1.4–7)
NEUTROPHILS NFR BLD AUTO: 61 %
PLATELET # BLD AUTO: 344 X10E3/UL (ref 150–450)
POTASSIUM SERPL-SCNC: 5 MMOL/L (ref 3.5–5.2)
PROT SERPL-MCNC: 7.3 G/DL (ref 6–8.5)
PSA SERPL-MCNC: 1.1 NG/ML (ref 0–4)
RBC # BLD AUTO: 5.56 X10E6/UL (ref 4.14–5.8)
SODIUM SERPL-SCNC: 139 MMOL/L (ref 134–144)
T4 FREE SERPL-MCNC: 1.27 NG/DL (ref 0.82–1.77)
TRIGL SERPL-MCNC: 110 MG/DL (ref 0–149)
TSH SERPL DL<=0.005 MIU/L-ACNC: 2.01 UIU/ML (ref 0.45–4.5)
VLDLC SERPL CALC-MCNC: 20 MG/DL (ref 5–40)
WBC # BLD AUTO: 8 X10E3/UL (ref 3.4–10.8)

## 2023-09-29 RX ORDER — ATORVASTATIN CALCIUM 40 MG/1
40 TABLET, FILM COATED ORAL DAILY
Qty: 90 TABLET | Refills: 3 | Status: SHIPPED | OUTPATIENT
Start: 2023-09-29

## 2023-09-29 RX ORDER — BLOOD-GLUCOSE METER
1 KIT MISCELLANEOUS DAILY
Qty: 1 EACH | Refills: 0 | Status: SHIPPED | OUTPATIENT
Start: 2023-09-29

## 2023-09-29 RX ORDER — LANCETS 30 GAUGE
1 EACH MISCELLANEOUS DAILY
Qty: 90 EACH | Refills: 3 | Status: SHIPPED | OUTPATIENT
Start: 2023-09-29

## 2024-04-11 ENCOUNTER — OFFICE VISIT (OUTPATIENT)
Dept: FAMILY MEDICINE CLINIC | Facility: CLINIC | Age: 69
End: 2024-04-11
Payer: MEDICARE

## 2024-04-11 VITALS
HEIGHT: 65 IN | HEART RATE: 56 BPM | BODY MASS INDEX: 29.09 KG/M2 | DIASTOLIC BLOOD PRESSURE: 80 MMHG | SYSTOLIC BLOOD PRESSURE: 110 MMHG | WEIGHT: 174.6 LBS | OXYGEN SATURATION: 98 %

## 2024-04-11 DIAGNOSIS — G57.62 MORTON'S NEUROMA OF LEFT FOOT: Primary | ICD-10-CM

## 2024-04-11 DIAGNOSIS — E11.43 TYPE 2 DIABETES MELLITUS WITH DIABETIC AUTONOMIC NEUROPATHY, WITHOUT LONG-TERM CURRENT USE OF INSULIN: ICD-10-CM

## 2024-04-11 DIAGNOSIS — E78.2 MIXED HYPERLIPIDEMIA: ICD-10-CM

## 2024-04-11 PROCEDURE — 99214 OFFICE O/P EST MOD 30 MIN: CPT | Performed by: FAMILY MEDICINE

## 2024-04-11 RX ORDER — BLOOD-GLUCOSE METER
1 KIT MISCELLANEOUS DAILY
Qty: 1 EACH | Refills: 0 | Status: SHIPPED | OUTPATIENT
Start: 2024-04-11

## 2024-04-11 RX ORDER — LANCETS 30 GAUGE
1 EACH MISCELLANEOUS DAILY
Qty: 90 EACH | Refills: 3 | Status: SHIPPED | OUTPATIENT
Start: 2024-04-11

## 2024-04-11 RX ORDER — ATORVASTATIN CALCIUM 40 MG/1
40 TABLET, FILM COATED ORAL DAILY
Qty: 90 TABLET | Refills: 3 | Status: SHIPPED | OUTPATIENT
Start: 2024-04-11

## 2024-04-11 NOTE — PROGRESS NOTES
Chief Complaint  Foot Pain (Left foot pain x 6 months getting worse /States behind toe middle toe pain worse )    Subjective      Artur Pinto presents to [facility]  History of Present Illness  Patient says he has been having pain in the sole of his left foot for about 6 months, just proximal to the second and third toes.  He says it really only hurts when he goes barefoot, which she only does for a little while each evening.  If he wear shoes or boots it does not bother him.  He said he has had it before but it went away for several months and then returned, with no known injury.  Denies any swelling redness numbness or burning.    The patient's last visit here was September 2023, and his lab work at that time revealed an A1c of 6.6 and an elevated cholesterol.  I sent the patient a detailed letter explaining that he now has type 2 diabetes, along with the description of diabetes management, prescriptions for glucose monitor and other testing supplies which I instructed him to get at the pharmacy.  I also enclosed a prescription for atorvastatin and explained the risk and benefits of this medication at length, and asked him to start taking it to help reduce his risk for stroke and heart attack.  Unfortunately, the patient tells me today that he has always been disabled because he does not read well.  He does not recall hitting the letter or anything that was discussed with the letter.  I asked him if he has anyone that helps him with his male, and he assured me that he does, so I showed him a copy of the letter and the prescriptions that were attached, and he said he did not remember them.  I therefore reprinted everything for him and explained everything in the letter again, including diabetes, which he knows very little about, the pathophysiology, importance of management, the need for yearly eye exam and good footcare, possible complications, and the importance of taking a  "statin, along with risk and side effects.  The patient admits that he does not really like the idea of taking medications, and therefore really never committed to taking the atorvastatin, but he was given the prescription and strongly encouraged to do so.  I also strongly recommended that the patient allow me to refer him to a dietitian for diabetes education, especially since he knows very little about the condition, but he declined, and said he would think about it in the future let me know.  He expressed understanding of everything we talked about.  Objective   Vital Signs:   Vitals:    04/11/24 1032   BP: 110/80   BP Location: Left arm   Patient Position: Sitting   Cuff Size: Adult   Pulse: 56   SpO2: 98%   Weight: 79.2 kg (174 lb 9.6 oz)   Height: 165.1 cm (65\")      /80 (BP Location: Left arm, Patient Position: Sitting, Cuff Size: Adult)   Pulse 56   Ht 165.1 cm (65\")   Wt 79.2 kg (174 lb 9.6 oz)   SpO2 98%   BMI 29.05 kg/m²     Body mass index is 29.05 kg/m².    Review of Systems    Past History:  Medical History: has a past medical history of BPH associated with nocturia and Colon cancer screening (06/2022).   Surgical History: has a past surgical history that includes Inguinal hernia repair (Left, 2010); Colonoscopy (1998); and Cystoscopy.   Family History: family history includes Diabetes type II in his sister; Heart failure in his mother; Peripheral vascular disease in his mother; Stomach cancer in his father.   Social History: reports that he has never smoked. He has never used smokeless tobacco. He reports current alcohol use of about 3.0 standard drinks of alcohol per week. He reports that he does not use drugs.      Current Outpatient Medications:     atorvastatin (Lipitor) 40 MG tablet, Take 1 tablet by mouth Daily., Disp: 90 tablet, Rfl: 3    glucose blood test strip, Check glucose daily, DX E11.9, Disp: 90 each, Rfl: 3    glucose monitor monitoring kit, Use 1 each Daily. Dx E11.9, Disp: " 1 each, Rfl: 0    Lancets misc, Use 1 each Daily. Dx E11.9, Disp: 90 each, Rfl: 3    Allergies: Patient has no known allergies.    Physical Exam  Constitutional:       General: He is not in acute distress.     Appearance: He is not toxic-appearing.   HENT:      Head: Normocephalic.      Right Ear: External ear normal.      Left Ear: External ear normal.      Nose: Nose normal.      Mouth/Throat:      Mouth: Mucous membranes are moist.      Pharynx: Oropharynx is clear.   Eyes:      Extraocular Movements: Extraocular movements intact.      Conjunctiva/sclera: Conjunctivae normal.      Pupils: Pupils are equal, round, and reactive to light.   Cardiovascular:      Rate and Rhythm: Normal rate and regular rhythm.      Pulses: Normal pulses.           Dorsalis pedis pulses are 2+ on the right side and 2+ on the left side.        Posterior tibial pulses are 2+ on the right side and 2+ on the left side.      Heart sounds: Normal heart sounds.   Pulmonary:      Effort: Pulmonary effort is normal.      Breath sounds: Normal breath sounds.   Musculoskeletal:      Cervical back: Normal range of motion.      Right foot: No deformity.      Left foot: No deformity.   Feet:      Right foot:      Protective Sensation: 5 sites tested.  5 sites sensed.      Skin integrity: Skin integrity normal.      Left foot:      Protective Sensation: 5 sites tested.  5 sites sensed.      Skin integrity: Skin integrity normal.      Comments:   Patient was tender to palpation on the sole of the left foot just proximal to the area between the second and third toes, but there was no lump or mass, no redness blisters or ulceration  Lymphadenopathy:      Cervical: No cervical adenopathy.   Skin:     General: Skin is warm and dry.      Capillary Refill: Capillary refill takes less than 2 seconds.      Coloration: Skin is not jaundiced or pale.      Findings: No bruising, erythema or rash.   Neurological:      General: No focal deficit present.       Mental Status: He is alert and oriented to person, place, and time.      Cranial Nerves: No cranial nerve deficit.      Sensory: No sensory deficit.      Motor: No weakness.      Coordination: Coordination normal.      Gait: Gait normal.   Psychiatric:         Mood and Affect: Mood normal.         Behavior: Behavior normal.                   Assessment and Plan   Diagnoses and all orders for this visit:    1. Smith's neuroma of left foot (Primary)  -     Ambulatory Referral to Podiatry  I told the patient that I think this is most likely explanation for his foot pain.  However, he says it really only bothers him when he walks around at night before going to bed with no shoes on.  He says it does not really bother him when he is wearing shoes or boots.  I did describe the type of padding that he may try in his shoes, but since it does not hurt when he wears his usual boots are not sure that he will pursue this.  I also recommended that we refer him to podiatry and he did agree to that  2. Type 2 diabetes mellitus with diabetic autonomic neuropathy, without long-term current use of insulin  Educated about condition, prescription for glucose meter, etc., see HPI.  Fortunately A1c was 6.6, so well-controlled without medication at this time.  He declines to see dietitian but will let us know if he changes mind  3. Mixed hyperlipidemia  Hopefully patient will agree to take atorvastatin, prescription was handed to him.  Other orders  -     atorvastatin (Lipitor) 40 MG tablet; Take 1 tablet by mouth Daily.  Dispense: 90 tablet; Refill: 3  -     glucose blood test strip; Check glucose daily, DX E11.9  Dispense: 90 each; Refill: 3  -     glucose monitor monitoring kit; Use 1 each Daily. Dx E11.9  Dispense: 1 each; Refill: 0  -     Lancets misc; Use 1 each Daily. Dx E11.9  Dispense: 90 each; Refill: 3            Follow Up   No follow-ups on file.  Patient was given instructions and counseling regarding his condition or for  health maintenance advice. Please see specific information pulled into the AVS if appropriate.     Kameron Pro MD

## 2024-10-22 ENCOUNTER — OFFICE VISIT (OUTPATIENT)
Dept: FAMILY MEDICINE CLINIC | Facility: CLINIC | Age: 69
End: 2024-10-22
Payer: MEDICARE

## 2024-10-22 VITALS
HEART RATE: 61 BPM | DIASTOLIC BLOOD PRESSURE: 76 MMHG | SYSTOLIC BLOOD PRESSURE: 116 MMHG | HEIGHT: 65 IN | WEIGHT: 169 LBS | OXYGEN SATURATION: 96 % | BODY MASS INDEX: 28.16 KG/M2

## 2024-10-22 DIAGNOSIS — Z79.899 ENCOUNTER FOR LONG-TERM (CURRENT) USE OF HIGH-RISK MEDICATION: ICD-10-CM

## 2024-10-22 DIAGNOSIS — Z12.5 SCREENING FOR PROSTATE CANCER: ICD-10-CM

## 2024-10-22 DIAGNOSIS — Z00.01 ENCOUNTER FOR GENERAL ADULT MEDICAL EXAMINATION WITH ABNORMAL FINDINGS: Primary | ICD-10-CM

## 2024-10-22 DIAGNOSIS — R35.1 BENIGN PROSTATIC HYPERPLASIA WITH NOCTURIA: ICD-10-CM

## 2024-10-22 DIAGNOSIS — N40.1 BENIGN PROSTATIC HYPERPLASIA WITH NOCTURIA: ICD-10-CM

## 2024-10-22 DIAGNOSIS — Z23 IMMUNIZATION DUE: ICD-10-CM

## 2024-10-22 DIAGNOSIS — E11.42 TYPE 2 DIABETES MELLITUS WITH PERIPHERAL NEUROPATHY: ICD-10-CM

## 2024-10-22 DIAGNOSIS — E78.2 MIXED HYPERLIPIDEMIA: ICD-10-CM

## 2024-10-22 PROBLEM — E11.43 TYPE 2 DIABETES MELLITUS WITH DIABETIC AUTONOMIC NEUROPATHY, WITHOUT LONG-TERM CURRENT USE OF INSULIN: Status: RESOLVED | Noted: 2024-04-11 | Resolved: 2024-10-22

## 2024-10-22 LAB
EXPIRATION DATE: NORMAL
Lab: NORMAL
POC CREATININE URINE: 100
POC MICROALBUMIN URINE: 10

## 2024-10-22 PROCEDURE — G0008 ADMIN INFLUENZA VIRUS VAC: HCPCS | Performed by: FAMILY MEDICINE

## 2024-10-22 PROCEDURE — 1125F AMNT PAIN NOTED PAIN PRSNT: CPT | Performed by: FAMILY MEDICINE

## 2024-10-22 PROCEDURE — 82044 UR ALBUMIN SEMIQUANTITATIVE: CPT | Performed by: FAMILY MEDICINE

## 2024-10-22 PROCEDURE — 3044F HG A1C LEVEL LT 7.0%: CPT | Performed by: FAMILY MEDICINE

## 2024-10-22 PROCEDURE — 1160F RVW MEDS BY RX/DR IN RCRD: CPT | Performed by: FAMILY MEDICINE

## 2024-10-22 PROCEDURE — 90662 IIV NO PRSV INCREASED AG IM: CPT | Performed by: FAMILY MEDICINE

## 2024-10-22 PROCEDURE — 1159F MED LIST DOCD IN RCRD: CPT | Performed by: FAMILY MEDICINE

## 2024-10-22 PROCEDURE — 99397 PER PM REEVAL EST PAT 65+ YR: CPT | Performed by: FAMILY MEDICINE

## 2024-10-22 RX ORDER — ATORVASTATIN CALCIUM 40 MG/1
40 TABLET, FILM COATED ORAL DAILY
Qty: 90 TABLET | Refills: 3 | Status: SHIPPED | OUTPATIENT
Start: 2024-10-22

## 2024-10-22 RX ORDER — ATORVASTATIN CALCIUM 40 MG/1
40 TABLET, FILM COATED ORAL DAILY
Qty: 90 TABLET | Refills: 3 | Status: SHIPPED | OUTPATIENT
Start: 2024-10-22 | End: 2024-10-22 | Stop reason: SDUPTHER

## 2024-10-22 NOTE — PROGRESS NOTES
"Chief Complaint  Annual Exam    Subjective      Artur Pinto presents to [facility]  History of Present Illness  Patient is here for annual exam.  He denies any problems today.  The patient has been taking the atorvastatin with no adverse effects.  He still has BPH symptoms but declines any treatment.  The risk of postponing treatment were discussed including bladder decompensation, infections, and kidney failure, but at this time he does not wish to pursue any further treatment.  Objective   Vital Signs:   Vitals:    10/22/24 0834   BP: 116/76   BP Location: Left arm   Patient Position: Sitting   Cuff Size: Adult   Pulse: 61   SpO2: 96%   Weight: 76.7 kg (169 lb)   Height: 165.1 cm (65\")      /76 (BP Location: Left arm, Patient Position: Sitting, Cuff Size: Adult)   Pulse 61   Ht 165.1 cm (65\")   Wt 76.7 kg (169 lb)   SpO2 96%   BMI 28.12 kg/m²     Body mass index is 28.12 kg/m².    Review of Systems   Constitutional:  Negative for chills, fever and unexpected weight loss.   HENT:  Negative for ear discharge, ear pain, mouth sores, nosebleeds, rhinorrhea, sinus pressure, sore throat, swollen glands and trouble swallowing.    Eyes:  Negative for blurred vision, double vision, pain, redness and visual disturbance.   Respiratory:  Negative for cough, shortness of breath and wheezing.    Cardiovascular:  Negative for chest pain, palpitations and leg swelling.        PND, orthopnea   Gastrointestinal:  Negative for abdominal distention, abdominal pain, blood in stool, constipation, diarrhea, nausea, vomiting and GERD.        Dysphagia, odynophagia   Endocrine: Negative for polydipsia, polyphagia and polyuria.   Genitourinary:  Positive for frequency, nocturia and urgency. Negative for dysuria, hematuria and urinary incontinence.   Musculoskeletal:  Negative for arthralgias (unusual/atypica), back pain, gait problem, joint swelling and myalgias.   Skin:  Negative for rash, " skin lesions (worrisome/suspicious) and bruise.   Allergic/Immunologic: Negative for food allergies.   Neurological:  Negative for dizziness, tremors, seizures, syncope, weakness, light-headedness, numbness and headache.   Hematological:  Negative for adenopathy. Does not bruise/bleed easily.   Psychiatric/Behavioral:  Negative for suicidal ideas and depressed mood. The patient is not nervous/anxious.        Past History:  Medical History: has a past medical history of BPH associated with nocturia and Colon cancer screening (06/2022).   Surgical History: has a past surgical history that includes Inguinal hernia repair (Left, 2010); Colonoscopy (1998); and Cystoscopy.   Family History: family history includes Diabetes type II in his sister; Heart failure in his mother; Peripheral vascular disease in his mother; Stomach cancer in his father.   Social History: reports that he has never smoked. He has never used smokeless tobacco. He reports current alcohol use of about 3.0 standard drinks of alcohol per week. He reports that he does not use drugs.      Current Outpatient Medications:     atorvastatin (Lipitor) 40 MG tablet, Take 1 tablet by mouth Daily., Disp: 90 tablet, Rfl: 3    glucose blood test strip, Check glucose daily, DX E11.9, Disp: 90 each, Rfl: 3    glucose monitor monitoring kit, Use 1 each Daily. Dx E11.9, Disp: 1 each, Rfl: 0    Lancets misc, Use 1 each Daily. Dx E11.9, Disp: 90 each, Rfl: 3    Allergies: Patient has no known allergies.    Physical Exam  Constitutional:       General: He is not in acute distress.     Appearance: He is not toxic-appearing.   HENT:      Head: Normocephalic and atraumatic.      Right Ear: Ear canal and external ear normal.      Left Ear: Ear canal and external ear normal.      Nose: Nose normal.      Mouth/Throat:      Mouth: Mucous membranes are moist.      Pharynx: Oropharynx is clear.   Eyes:      General: No scleral icterus.     Extraocular Movements: Extraocular  movements intact.      Conjunctiva/sclera: Conjunctivae normal.      Pupils: Pupils are equal, round, and reactive to light.   Neck:      Vascular: No carotid bruit.   Cardiovascular:      Rate and Rhythm: Normal rate and regular rhythm.      Pulses: Normal pulses.      Heart sounds: Normal heart sounds. No murmur heard.     No gallop.   Pulmonary:      Effort: Pulmonary effort is normal.      Breath sounds: Normal breath sounds. No wheezing or rales.   Chest:      Chest wall: No tenderness.   Abdominal:      General: Bowel sounds are normal. There is no distension.      Palpations: Abdomen is soft.      Tenderness: There is no abdominal tenderness. There is no guarding or rebound.   Musculoskeletal:         General: No swelling or deformity. Normal range of motion.      Cervical back: Normal range of motion. No rigidity.      Right lower leg: No edema.      Left lower leg: No edema.   Lymphadenopathy:      Cervical: No cervical adenopathy.   Skin:     General: Skin is warm and dry.      Capillary Refill: Capillary refill takes less than 2 seconds.      Coloration: Skin is not pale.      Findings: No erythema or rash.   Neurological:      General: No focal deficit present.      Mental Status: He is alert and oriented to person, place, and time.      Cranial Nerves: No cranial nerve deficit.      Motor: No weakness.      Coordination: Coordination normal.      Gait: Gait normal.   Psychiatric:         Mood and Affect: Mood normal.         Behavior: Behavior normal.         Thought Content: Thought content normal.         Judgment: Judgment normal.                   Assessment and Plan   Diagnoses and all orders for this visit:    1. Encounter for general adult medical examination with abnormal findings (Primary)  Healthy lifestyle measures including healthy diet regular exercise and weight reduction were discussed.  Preventive healthcare measures were also discussed.  Importance of yearly eye exam was stressed and he  says he will call to schedule his own appointment.  He was given a flu vaccine.  He will continue current medication and we will check labs  2. Immunization due  -     Fluzone High-Dose 65+yrs    3. Mixed hyperlipidemia  -     Lipid Panel    4. Type 2 diabetes mellitus with peripheral neuropathy  -     Hemoglobin A1c  This has been well-controlled without medications so far we will check labs today  5. Screening for prostate cancer  -     PSA Screen    6. Benign prostatic hyperplasia with nocturia  Patient will let me know if he changes mind about treatment  7. Encounter for long-term (current) use of high-risk medication  -     CBC & Differential  -     Comprehensive Metabolic Panel    Other orders  -     Discontinue: atorvastatin (Lipitor) 40 MG tablet; Take 1 tablet by mouth Daily.  Dispense: 90 tablet; Refill: 3  -     atorvastatin (Lipitor) 40 MG tablet; Take 1 tablet by mouth Daily.  Dispense: 90 tablet; Refill: 3            Follow Up   Return in about 6 months (around 4/22/2025) for Recheck.  Patient was given instructions and counseling regarding his condition or for health maintenance advice. Please see specific information pulled into the AVS if appropriate.     Kameron Pro MD

## 2024-10-23 ENCOUNTER — TELEPHONE (OUTPATIENT)
Dept: FAMILY MEDICINE CLINIC | Facility: CLINIC | Age: 69
End: 2024-10-23
Payer: MEDICARE

## 2024-10-23 LAB
ALBUMIN SERPL-MCNC: 4.2 G/DL (ref 3.9–4.9)
ALP SERPL-CCNC: 70 IU/L (ref 44–121)
ALT SERPL-CCNC: 38 IU/L (ref 0–44)
AST SERPL-CCNC: 26 IU/L (ref 0–40)
BASOPHILS # BLD AUTO: 0.1 X10E3/UL (ref 0–0.2)
BASOPHILS NFR BLD AUTO: 1 %
BILIRUB SERPL-MCNC: 0.9 MG/DL (ref 0–1.2)
BUN SERPL-MCNC: 15 MG/DL (ref 8–27)
BUN/CREAT SERPL: 15 (ref 10–24)
CALCIUM SERPL-MCNC: 9.5 MG/DL (ref 8.6–10.2)
CHLORIDE SERPL-SCNC: 104 MMOL/L (ref 96–106)
CHOLEST SERPL-MCNC: 142 MG/DL (ref 100–199)
CO2 SERPL-SCNC: 24 MMOL/L (ref 20–29)
CREAT SERPL-MCNC: 1.02 MG/DL (ref 0.76–1.27)
EGFRCR SERPLBLD CKD-EPI 2021: 79 ML/MIN/1.73
EOSINOPHIL # BLD AUTO: 0.7 X10E3/UL (ref 0–0.4)
EOSINOPHIL NFR BLD AUTO: 8 %
ERYTHROCYTE [DISTWIDTH] IN BLOOD BY AUTOMATED COUNT: 12.7 % (ref 11.6–15.4)
GLOBULIN SER CALC-MCNC: 2.4 G/DL (ref 1.5–4.5)
GLUCOSE SERPL-MCNC: 101 MG/DL (ref 70–99)
HBA1C MFR BLD: 6.8 % (ref 4.8–5.6)
HCT VFR BLD AUTO: 48.1 % (ref 37.5–51)
HDLC SERPL-MCNC: 38 MG/DL
HGB BLD-MCNC: 15.3 G/DL (ref 13–17.7)
IMM GRANULOCYTES # BLD AUTO: 0 X10E3/UL (ref 0–0.1)
IMM GRANULOCYTES NFR BLD AUTO: 0 %
LDLC SERPL CALC-MCNC: 84 MG/DL (ref 0–99)
LYMPHOCYTES # BLD AUTO: 2.8 X10E3/UL (ref 0.7–3.1)
LYMPHOCYTES NFR BLD AUTO: 31 %
MCH RBC QN AUTO: 30.1 PG (ref 26.6–33)
MCHC RBC AUTO-ENTMCNC: 31.8 G/DL (ref 31.5–35.7)
MCV RBC AUTO: 95 FL (ref 79–97)
MONOCYTES # BLD AUTO: 0.9 X10E3/UL (ref 0.1–0.9)
MONOCYTES NFR BLD AUTO: 10 %
NEUTROPHILS # BLD AUTO: 4.5 X10E3/UL (ref 1.4–7)
NEUTROPHILS NFR BLD AUTO: 50 %
PLATELET # BLD AUTO: 319 X10E3/UL (ref 150–450)
POTASSIUM SERPL-SCNC: 4.4 MMOL/L (ref 3.5–5.2)
PROT SERPL-MCNC: 6.6 G/DL (ref 6–8.5)
PSA SERPL-MCNC: 0.8 NG/ML (ref 0–4)
RBC # BLD AUTO: 5.09 X10E6/UL (ref 4.14–5.8)
SODIUM SERPL-SCNC: 139 MMOL/L (ref 134–144)
TRIGL SERPL-MCNC: 106 MG/DL (ref 0–149)
VLDLC SERPL CALC-MCNC: 20 MG/DL (ref 5–40)
WBC # BLD AUTO: 9 X10E3/UL (ref 3.4–10.8)

## 2024-10-24 NOTE — TELEPHONE ENCOUNTER
Orders entered Thanks Dr. Pro   
Please enter charge for flu vaccine so I can close the note, thanks  
none

## 2025-04-18 RX ORDER — ATORVASTATIN CALCIUM 40 MG/1
40 TABLET, FILM COATED ORAL DAILY
Qty: 90 TABLET | Refills: 3 | OUTPATIENT
Start: 2025-04-18

## 2025-04-22 ENCOUNTER — OFFICE VISIT (OUTPATIENT)
Dept: FAMILY MEDICINE CLINIC | Facility: CLINIC | Age: 70
End: 2025-04-22
Payer: MEDICARE

## 2025-04-22 VITALS
OXYGEN SATURATION: 99 % | BODY MASS INDEX: 29.16 KG/M2 | WEIGHT: 175 LBS | DIASTOLIC BLOOD PRESSURE: 72 MMHG | HEIGHT: 65 IN | HEART RATE: 57 BPM | SYSTOLIC BLOOD PRESSURE: 130 MMHG

## 2025-04-22 DIAGNOSIS — N40.1 BENIGN PROSTATIC HYPERPLASIA WITH NOCTURIA: ICD-10-CM

## 2025-04-22 DIAGNOSIS — Z12.11 COLON CANCER SCREENING: ICD-10-CM

## 2025-04-22 DIAGNOSIS — Z79.899 ENCOUNTER FOR LONG-TERM (CURRENT) USE OF MEDICATIONS: ICD-10-CM

## 2025-04-22 DIAGNOSIS — E11.42 TYPE 2 DIABETES MELLITUS WITH PERIPHERAL NEUROPATHY: Primary | ICD-10-CM

## 2025-04-22 DIAGNOSIS — G62.9 PERIPHERAL POLYNEUROPATHY: ICD-10-CM

## 2025-04-22 DIAGNOSIS — L82.1 SEBORRHEIC KERATOSIS: ICD-10-CM

## 2025-04-22 DIAGNOSIS — R23.8 INFLAMMATORY PAPULE: ICD-10-CM

## 2025-04-22 DIAGNOSIS — R35.1 BENIGN PROSTATIC HYPERPLASIA WITH NOCTURIA: ICD-10-CM

## 2025-04-22 DIAGNOSIS — E78.2 MIXED HYPERLIPIDEMIA: ICD-10-CM

## 2025-04-22 DIAGNOSIS — R53.83 OTHER FATIGUE: ICD-10-CM

## 2025-04-22 LAB
EXPIRATION DATE: NORMAL
Lab: NORMAL
POC ALBUMIN, URINE: 30 MG/L
POC CREATININE, URINE: 200 MG/DL
POC URINE ALB/CREA RATIO: <30

## 2025-04-22 RX ORDER — MUPIROCIN 20 MG/G
1 OINTMENT TOPICAL 2 TIMES DAILY
Qty: 22 G | Refills: 0 | Status: SHIPPED | OUTPATIENT
Start: 2025-04-22

## 2025-04-22 RX ORDER — ATORVASTATIN CALCIUM 40 MG/1
40 TABLET, FILM COATED ORAL DAILY
Qty: 90 TABLET | Refills: 3 | Status: SHIPPED | OUTPATIENT
Start: 2025-04-22

## 2025-04-22 NOTE — PROGRESS NOTES
"Chief Complaint  Hyperlipidemia and Diabetes    Subjective      Artur Pinto presents to [facility]  Hyperlipidemia  Pertinent negatives include no chest pain, myalgias or shortness of breath.   Diabetes  Pertinent negatives for hypoglycemia include no dizziness, nervousness/anxiousness, seizures or tremors. Pertinent negatives for diabetes include no blurred vision, no chest pain, no polydipsia, no polyphagia, no polyuria, no weakness and no weight loss.     Patient is here for 6-month checkup on diabetes and hyperlipidemia.  He says he has been feeling well overall.  He used to drink a few cans of soda daily, but changed to orange juice a few months ago.  I explained to the patient that practices in general are not healthy or nutritious and have a lot of empty calories, so alternatives were discussed especially since he has diabetes.  The patient still does not check his glucose at home.    He does have some skin lesions he wants me to check.  Objective   Vital Signs:   Vitals:    04/22/25 0936   BP: 130/72   BP Location: Left arm   Patient Position: Sitting   Cuff Size: Adult   Pulse: 57   SpO2: 99%   Weight: 79.4 kg (175 lb)   Height: 165.1 cm (65\")      /72 (BP Location: Left arm, Patient Position: Sitting, Cuff Size: Adult)   Pulse 57   Ht 165.1 cm (65\")   Wt 79.4 kg (175 lb)   SpO2 99%   BMI 29.12 kg/m²     Body mass index is 29.12 kg/m².    Review of Systems   Constitutional:  Negative for chills, fever and unexpected weight loss.   HENT:  Negative for ear discharge, ear pain, mouth sores, nosebleeds, rhinorrhea, sinus pressure, sore throat, swollen glands and trouble swallowing.    Eyes:  Negative for blurred vision, double vision, pain, redness and visual disturbance.   Respiratory:  Negative for cough, shortness of breath and wheezing.    Cardiovascular:  Negative for chest pain, palpitations and leg swelling.        PND, orthopnea   Gastrointestinal:  " Negative for abdominal distention, abdominal pain, blood in stool, constipation, diarrhea, nausea, vomiting and GERD.        Dysphagia, odynophagia   Endocrine: Negative for polydipsia, polyphagia and polyuria.   Genitourinary:  Negative for dysuria, hematuria and urinary incontinence.   Musculoskeletal:  Negative for arthralgias (unusual/atypica), gait problem, joint swelling and myalgias.   Skin:  Positive for skin lesions (worrisome/suspicious). Negative for rash and bruise.   Allergic/Immunologic: Negative for food allergies.   Neurological:  Negative for dizziness, tremors, seizures, syncope, weakness, light-headedness, numbness and headache.   Hematological:  Negative for adenopathy. Does not bruise/bleed easily.   Psychiatric/Behavioral:  Negative for suicidal ideas and depressed mood. The patient is not nervous/anxious.        Past History:  Medical History: has a past medical history of BPH associated with nocturia, Colon cancer screening (06/2022), and Type 2 diabetes mellitus with peripheral neuropathy.   Surgical History: has a past surgical history that includes Inguinal hernia repair (Left, 2010); Colonoscopy (1998); and Cystoscopy.   Family History: family history includes Diabetes type II in his sister; Heart failure in his mother; Peripheral vascular disease in his mother; Stomach cancer in his father.   Social History: reports that he has never smoked. He has never used smokeless tobacco. He reports current alcohol use of about 3.0 standard drinks of alcohol per week. He reports that he does not use drugs.      Current Outpatient Medications:     atorvastatin (Lipitor) 40 MG tablet, Take 1 tablet by mouth Daily., Disp: 90 tablet, Rfl: 3    glucose blood test strip, Check glucose daily, DX E11.9, Disp: 90 each, Rfl: 3    glucose monitor monitoring kit, Use 1 each Daily. Dx E11.9, Disp: 1 each, Rfl: 0    Lancets misc, Use 1 each Daily. Dx E11.9, Disp: 90 each, Rfl: 3    mupirocin (BACTROBAN) 2 %  ointment, Apply 1 Application topically to the appropriate area as directed 2 (Two) Times a Day. Apply to sore spot on left hand bid until healed, Disp: 22 g, Rfl: 0    Allergies: Patient has no known allergies.    Physical Exam  Constitutional:       General: He is not in acute distress.     Appearance: He is not toxic-appearing.   HENT:      Head: Normocephalic and atraumatic.      Right Ear: Ear canal and external ear normal.      Left Ear: Ear canal and external ear normal.      Nose: Nose normal.      Mouth/Throat:      Mouth: Mucous membranes are moist.      Pharynx: Oropharynx is clear.   Eyes:      General: No scleral icterus.     Extraocular Movements: Extraocular movements intact.      Conjunctiva/sclera: Conjunctivae normal.      Pupils: Pupils are equal, round, and reactive to light.   Neck:      Vascular: No carotid bruit.   Cardiovascular:      Rate and Rhythm: Normal rate and regular rhythm.      Pulses: Normal pulses.      Heart sounds: Normal heart sounds.   Pulmonary:      Effort: Pulmonary effort is normal.      Breath sounds: Normal breath sounds.   Chest:      Chest wall: No tenderness.   Abdominal:      General: Bowel sounds are normal. There is no distension.      Palpations: Abdomen is soft. There is no mass.      Tenderness: There is no abdominal tenderness. There is no guarding or rebound.   Musculoskeletal:         General: No swelling or deformity. Normal range of motion.      Cervical back: Normal range of motion. No rigidity.      Right lower leg: No edema.      Left lower leg: No edema.   Lymphadenopathy:      Cervical: No cervical adenopathy.   Skin:     General: Skin is warm and dry.      Capillary Refill: Capillary refill takes less than 2 seconds.      Coloration: Skin is not pale.      Findings: Lesion (Preoperative Oracle elbow seborrheic keratosis 1.5 cm left hand, red inflamed papule 8 mm dorsal left hand, 1 to 2 mm eschar left cheek) present. No erythema or rash.    Neurological:      General: No focal deficit present.      Mental Status: He is alert and oriented to person, place, and time.      Cranial Nerves: No cranial nerve deficit.      Motor: No weakness.      Coordination: Coordination normal.      Gait: Gait normal.   Psychiatric:         Mood and Affect: Mood normal.         Behavior: Behavior normal.         Judgment: Judgment normal.                   Assessment and Plan   Diagnoses and all orders for this visit:    1. Type 2 diabetes mellitus with peripheral neuropathy (Primary)  -     Hemoglobin A1c  Diabetes has been well-controlled so far without additional medications, we will check labs  2. Mixed hyperlipidemia  -     Lipid Panel    3. Benign prostatic hyperplasia with nocturia  Patient still declines treatment at this time  4. Colon cancer screening  -     Cologuard - Stool, Per Rectum; Future  Will be due in July  5. Other fatigue  -     TSH    6. Peripheral polyneuropathy  -     Folate  -     Vitamin B12    7. Encounter for long-term (current) use of medications  -     CBC & Differential  -     Comprehensive Metabolic Panel    8. Seborrheic keratosis  The larger lesion on the left hand looks like a benign seborrheic keratosis, so we will monitor and let me know if it gets bigger or changes colors.  9. Inflammatory papule  The inflamed papule on the dorsal left hand overlies the second MCP joint, about 8 mm in size, and has a small opening but no discharge, so he will apply Bactroban twice a day until healed.  If the area gets larger or does not heal over the next 4 to 6 weeks, he will call for dermatology referral, differential diagnosis including skin cancer was discussed.  A small eschar on the left cheek is too small to characterize but appears benign, and he admits that he tends to pick at it, so he was encouraged to keep the area clean and apply a small Band-Aid to prevent picking, and will let me know if it does not heal in 4 to 6 weeks  Other  orders  -     atorvastatin (Lipitor) 40 MG tablet; Take 1 tablet by mouth Daily.  Dispense: 90 tablet; Refill: 3  -     mupirocin (BACTROBAN) 2 % ointment; Apply 1 Application topically to the appropriate area as directed 2 (Two) Times a Day. Apply to sore spot on left hand bid until healed  Dispense: 22 g; Refill: 0            Follow Up   No follow-ups on file.  Patient was given instructions and counseling regarding his condition or for health maintenance advice. Please see specific information pulled into the AVS if appropriate.     Kameron Pro MD

## 2025-04-23 LAB
ALBUMIN SERPL-MCNC: 4.4 G/DL (ref 3.9–4.9)
ALP SERPL-CCNC: 71 IU/L (ref 44–121)
ALT SERPL-CCNC: 32 IU/L (ref 0–44)
AST SERPL-CCNC: 27 IU/L (ref 0–40)
BASOPHILS # BLD AUTO: 0.1 X10E3/UL (ref 0–0.2)
BASOPHILS NFR BLD AUTO: 1 %
BILIRUB SERPL-MCNC: 0.8 MG/DL (ref 0–1.2)
BUN SERPL-MCNC: 14 MG/DL (ref 8–27)
BUN/CREAT SERPL: 16 (ref 10–24)
CALCIUM SERPL-MCNC: 10 MG/DL (ref 8.6–10.2)
CHLORIDE SERPL-SCNC: 103 MMOL/L (ref 96–106)
CHOLEST SERPL-MCNC: 179 MG/DL (ref 100–199)
CO2 SERPL-SCNC: 22 MMOL/L (ref 20–29)
CREAT SERPL-MCNC: 0.9 MG/DL (ref 0.76–1.27)
EGFRCR SERPLBLD CKD-EPI 2021: 92 ML/MIN/1.73
EOSINOPHIL # BLD AUTO: 0.6 X10E3/UL (ref 0–0.4)
EOSINOPHIL NFR BLD AUTO: 6 %
ERYTHROCYTE [DISTWIDTH] IN BLOOD BY AUTOMATED COUNT: 12.8 % (ref 11.6–15.4)
FOLATE SERPL-MCNC: 11.4 NG/ML
GLOBULIN SER CALC-MCNC: 2.8 G/DL (ref 1.5–4.5)
GLUCOSE SERPL-MCNC: 106 MG/DL (ref 70–99)
HBA1C MFR BLD: 6.8 % (ref 4.8–5.6)
HCT VFR BLD AUTO: 50.1 % (ref 37.5–51)
HDLC SERPL-MCNC: 45 MG/DL
HGB BLD-MCNC: 16.2 G/DL (ref 13–17.7)
IMM GRANULOCYTES # BLD AUTO: 0 X10E3/UL (ref 0–0.1)
IMM GRANULOCYTES NFR BLD AUTO: 0 %
LDLC SERPL CALC-MCNC: 111 MG/DL (ref 0–99)
LYMPHOCYTES # BLD AUTO: 2.4 X10E3/UL (ref 0.7–3.1)
LYMPHOCYTES NFR BLD AUTO: 24 %
MCH RBC QN AUTO: 30.1 PG (ref 26.6–33)
MCHC RBC AUTO-ENTMCNC: 32.3 G/DL (ref 31.5–35.7)
MCV RBC AUTO: 93 FL (ref 79–97)
MONOCYTES # BLD AUTO: 0.7 X10E3/UL (ref 0.1–0.9)
MONOCYTES NFR BLD AUTO: 8 %
NEUTROPHILS # BLD AUTO: 5.8 X10E3/UL (ref 1.4–7)
NEUTROPHILS NFR BLD AUTO: 61 %
PLATELET # BLD AUTO: 346 X10E3/UL (ref 150–450)
POTASSIUM SERPL-SCNC: 4.9 MMOL/L (ref 3.5–5.2)
PROT SERPL-MCNC: 7.2 G/DL (ref 6–8.5)
RBC # BLD AUTO: 5.38 X10E6/UL (ref 4.14–5.8)
SODIUM SERPL-SCNC: 141 MMOL/L (ref 134–144)
TRIGL SERPL-MCNC: 126 MG/DL (ref 0–149)
TSH SERPL DL<=0.005 MIU/L-ACNC: 2.82 UIU/ML (ref 0.45–4.5)
VIT B12 SERPL-MCNC: 310 PG/ML (ref 232–1245)
VLDLC SERPL CALC-MCNC: 23 MG/DL (ref 5–40)
WBC # BLD AUTO: 9.6 X10E3/UL (ref 3.4–10.8)